# Patient Record
Sex: MALE | Race: WHITE
[De-identification: names, ages, dates, MRNs, and addresses within clinical notes are randomized per-mention and may not be internally consistent; named-entity substitution may affect disease eponyms.]

---

## 2019-12-15 ENCOUNTER — HOSPITAL ENCOUNTER (EMERGENCY)
Dept: HOSPITAL 62 - ER | Age: 29
Discharge: HOME | End: 2019-12-15
Payer: SELF-PAY

## 2019-12-15 VITALS — DIASTOLIC BLOOD PRESSURE: 82 MMHG | SYSTOLIC BLOOD PRESSURE: 121 MMHG
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
## 2019-12-15 DIAGNOSIS — M25.512: ICD-10-CM

## 2019-12-15 DIAGNOSIS — M79.641: ICD-10-CM

## 2019-12-15 DIAGNOSIS — S49.92XA: Primary | ICD-10-CM

## 2019-12-15 PROCEDURE — 73000 X-RAY EXAM OF COLLAR BONE: CPT

## 2019-12-15 PROCEDURE — L3650 SO 8 ABD RESTRAINT PRE OTS: HCPCS

## 2019-12-15 PROCEDURE — 99283 EMERGENCY DEPT VISIT LOW MDM: CPT

## 2019-12-15 NOTE — ER DOCUMENT REPORT
ED General





- General


Chief Complaint: Fall Injury


Stated Complaint: FALL/LEFT COLLARBONE,SHOULDER PAIN


Time Seen by Provider: 12/15/19 10:54


Primary Care Provider: 


CRISELDA HARDWICK JR, DO [ACTIVE PROVISIONAL STAFF] - Follow up in 1 week (for 

ortho follow up if symptoms persist)


TRAVEL OUTSIDE OF THE [place] IN LAST 30 DAYS: No





- HPI


Notes: 





29 year old male to the ED with C/O left clavicle pain since yesterday.  States 

he was walking his dogs, when they pulled him down and he landed onto the left 

clavicle.  States it hurts to raise his left shoulder and even hurts to use his 

right hand to get up out of bed.   He is right hand dominant.  Has not taken 

anything for the pain.  Denies any other injuries. 





- Related Data


Allergies/Adverse Reactions: 


                                        





No Known Allergies Allergy (Verified 12/15/19 08:56)


   











Past Medical History





- General


Information source: Patient





- Social History


Smoking Status: Never Smoker


Chew tobacco use (# tins/day): No


Frequency of alcohol use: Occasional


Drug Abuse: None


Family History: Reviewed & Not Pertinent


Patient has suicidal ideation: No


Patient has homicidal ideation: No





- Immunizations


Hx Diphtheria, Pertussis, Tetanus Vaccination: Yes





Review of Systems





- Review of Systems


Constitutional: denies: Chills, Fever


EENT: No symptoms reported


Cardiovascular: denies: Chest pain, Palpitations, Orthopnea, Dyspnea, Syncope, 

Dizziness, Lightheaded


Respiratory: denies: Cough, Short of breath


Gastrointestinal: denies: Abdominal pain, Diarrhea, Nausea, Vomiting


Musculoskeletal: See HPI, Joint pain - left collarbone pain.  denies: Deformity


Skin: No symptoms reported


Hematologic/Lymphatic: No symptoms reported


Neurological/Psychological: No symptoms reported


-: Yes All other systems reviewed and negative





Physical Exam





- Vital signs


Vitals: 


                                        











Temp Pulse Resp BP Pulse Ox


 


 99.1 F   82   18   141/89 H  98 


 


 12/15/19 08:39  12/15/19 08:39  12/15/19 08:39  12/15/19 08:39  12/15/19 08:39











Interpretation: Normal





- General


General appearance: Appears well, Alert





- HEENT


Head: Normocephalic, Atraumatic


Eyes: Normal


Pupils: PERRL





- Respiratory


Respiratory status: No respiratory distress


Chest status: Nontender


Breath sounds: Normal


Chest palpation: Normal





- Cardiovascular


Rhythm: Regular


Heart sounds: Normal auscultation


Murmur: No





- Abdominal


Inspection: Normal


Distension: No distension


Bowel sounds: Normal


Tenderness: Nontender


Organomegaly: No organomegaly





- Extremities


General upper extremity: Normal inspection, Nontender, Normal color, Normal ROM,

Normal temperature


General lower extremity: Normal inspection, Nontender, Normal color, Normal ROM,

Normal temperature, Normal weight bearing


Notes: 





non tender to palpation of the midline cervical, thoracic, and lumbar spine.  

There is TTP over the left clavicle without any gross deformity or edema.  There

is no TTP over the left shoulder joint.  There is increased pain when extending 

the left upper arm and adducting the left shoulder.   





- Neurological


Neuro grossly intact: Yes


Cognition: Normal


Orientation: AAOx4


[place] Coma Scale Eye Opening: Spontaneous


[place] Coma Scale Verbal: Oriented


[place] Coma Scale Motor: Obeys Commands


[place] Coma Scale Total: 15


Speech: Normal


Motor strength normal: LUE, RUE, LLE, RLE


Sensory: Normal





- Psychological


Associated symptoms: Normal affect, Normal mood





- Skin


Skin Temperature: Warm


Skin Moisture: Dry


Skin Color: Normal





Course





- Re-evaluation


Re-evalutation: 








                                        





Clavicle X-Ray  12/15/19 00:00


IMPRESSION:  Normal appearance of the left clavicle..


 








Impression:  Left clavicle injury.  No XR seen by me on XR and confirmed by 

rad.,  Will place in shoulder immobilizer and treat with pain meds.  Encouraged 

ortho follow up if pain has not gotten better in one week. Patient agrees with 

the plan. 








- Vital Signs


Vital signs: 


                                        











Temp Pulse Resp BP Pulse Ox


 


 97.4 F   87   16   121/82   100 


 


 12/15/19 11:12  12/15/19 11:12  12/15/19 11:12  12/15/19 11:12  12/15/19 11:12














Procedures





- Immobilization


  ** Left Shoulder


Pre-Proc Neuro Vasc Exam: Normal


Immobilizer type: Shoulder immobilizer


Performed by: PCT


Post-Proc Neuro Vasc Exam: Normal


Alignment checked and good: Yes





Discharge





- Discharge


Clinical Impression: 


 Shoulder girdle symptom





Injury of left clavicle


Qualifiers:


 Encounter type: initial encounter Qualified Code(s): S49.92XA - Unspecified 

injury of left shoulder and upper arm, initial encounter





Fall


Qualifiers:


 Encounter type: initial encounter Qualified Code(s): W19.XXXA - Unspecified 

fall, initial encounter





Condition: Stable


Disposition: HOME, SELF-CARE


Instructions:  Muscle Strain ([facility])


Additional Instructions: 


FOLLOW UP WITH ORTHOPEDIST IF SYMPTOMS ARE NOT IMPROVED IN ONE WEEK. WEAR 

SHOULDER IMMOBILIZER WITHOUT FAIL.  ICE THE CLAVICLE/SHOULDER THREE TIMES A DAY 

FOR 20 MINUTES.  TAKE MEDICINES AS PRESCRIBED.  


Prescriptions: 


Tramadol HCl [Ultram 50 mg Tablet] 50 mg PO TID #9 tab


Referrals: 


CRISELDA HARDWICK JR, DO [ACTIVE PROVISIONAL STAFF] - Follow up in 1 week (for 

ortho follow up if symptoms persist)

## 2019-12-15 NOTE — RADIOLOGY REPORT (SQ)
EXAM DESCRIPTION:  CLAVICLE LEFT



COMPLETED DATE/TIME:  12/15/2019 9:37 am



REASON FOR STUDY:  bone tenderness



COMPARISON:  None.



NUMBER OF VIEWS:  Two views.



TECHNIQUE:  Frontal and angled images were acquired of the left clavicle.



LIMITATIONS:  None.



FINDINGS:  MINERALIZATION: Normal.

BONES: No acute fracture or dislocation. No worrisome bone lesions. No significant osteophytes.

SOFT TISSUES: No obvious swelling or foreign body.

OTHER: No other significant finding.



IMPRESSION:  Normal appearance of the left clavicle..



TECHNICAL DOCUMENTATION:  JOB ID:  [id number]

 2011 [employer]- All Rights Reserved



Reading location - IP/workstation name: [email or web address]